# Patient Record
Sex: MALE | Race: OTHER | HISPANIC OR LATINO | URBAN - METROPOLITAN AREA
[De-identification: names, ages, dates, MRNs, and addresses within clinical notes are randomized per-mention and may not be internally consistent; named-entity substitution may affect disease eponyms.]

---
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
## 2021-10-21 NOTE — CONSULT NOTE ADULT - ATTENDING COMMENTS
Is This A New Presentation, Or A Follow-Up?: Skin Lesion
What Type Of Note Output Would You Prefer (Optional)?: Bullet Format
How Severe Is Your Skin Lesion?: mild
Has Your Skin Lesion Been Treated?: not been treated
pt examined   Tender epigastric area  labs noted, gallstone pancreatitis  GI consult

## 2023-04-14 NOTE — ED ADULT NURSE NOTE - NS ED NOTE ABUSE SUSPICION NEGLECT YN
Attending Only Attending Only Attending Only No Attending Only Attending Only Attending Only Attending Only Attending Only Attending Only Attending Only Attending Only Attending Only Attending Only Attending Only Attending Only

## 2023-05-11 NOTE — PROGRESS NOTE ADULT - ATTENDING COMMENTS
pt seen in am  Feeling much better, less tender in the epigastric area  For MRCP
gallstone pancreatitis  Wants to have out patient lap cesar
seen this morning at bedside.     Feeling much better. less abdominal pain  afebrile, no leukocytosis  MRCP results reviewed normal bile ducts, nondistended GB  Surgery follow up appreciated    OE  NAD  mild tenderness in RUQ ,+BS  RRR s1s2  Clear lungs    A/P  Acute pancreatitis  Acute cholecystitis   Elevated LFTs secondary to above- trending down       NPO  IVF and IV antibiotics and pain control  GI fu
Seen this morning at bedside.    Reports improved abdominal pain.  no nausea or vomiting.    OE  NAD  heart: RRR  Lungs: clear  Abd: soft, tender RUQ and epigastric area  Extremities: no edema    A/P  Gall stone pancreatitis  Sonographic evidence of Acute cholecystitis although patient is without fever,leukocytosis    General surgery and GI  consult appreciated,   NPO, IVF and IV antibiotics and pain control  Trend LFTS and lipase  Awaits MRCP
Alert and oriented to person, place, time/situation. normal mood and affect. no apparent risk to self or others.